# Patient Record
Sex: FEMALE | NOT HISPANIC OR LATINO | URBAN - METROPOLITAN AREA
[De-identification: names, ages, dates, MRNs, and addresses within clinical notes are randomized per-mention and may not be internally consistent; named-entity substitution may affect disease eponyms.]

---

## 2017-05-04 NOTE — PATIENT DISCUSSION
ELLIS OU:  PRESCRIBE ARTIFICIAL TEARS BID - QID. DECREASE OUTDOOR EXPOSURE AND USE UV PROTECTION/ SUNGLASSES WITH OUTDOOR ACTIVITIES. RETURN FOR FOLLOW UP AS SCHEDULED.

## 2017-05-04 NOTE — PATIENT DISCUSSION
Pinguecula Counseling:  I have explained to the patient at length the diagnosis of pinguecula and its pathophysiology. I recommended the patient adequately protect their eyes from excessive UV light and dry, leslie conditions. The use of artificial tears in dry conditions was encouraged. Return for follow-up as scheduled.

## 2017-10-12 ENCOUNTER — IMPORTED ENCOUNTER (OUTPATIENT)
Dept: URBAN - METROPOLITAN AREA CLINIC 1 | Facility: CLINIC | Age: 47
End: 2017-10-12

## 2017-10-12 PROBLEM — H52.13: Noted: 2017-10-12

## 2017-10-12 PROBLEM — H52.4: Noted: 2017-10-12

## 2017-10-12 PROCEDURE — S0621 ROUTINE OPHTHALMOLOGICAL EXA: HCPCS

## 2017-10-12 NOTE — PATIENT DISCUSSION
1. Myopia: Rx was given for correction if indicated and requested. 2. PresbyopiaReturn for an appointment in 1 year 36 with Dr. Annmarie Ivory.

## 2019-06-04 NOTE — PATIENT DISCUSSION
IF CONJUNCTIVAL CYST CONTINUES TO CAUSE FOREIGN BODY SENSATION, SUGGEST REFERRAL TO DR. Veronica Nowak TO HAVE IT DRAINED.

## 2019-06-04 NOTE — PATIENT DISCUSSION
New Prescription: prednisolone acetate (prednisolone acetate): drops,suspension: 1% 1 drop three times a day into left eye 06-

## 2019-06-04 NOTE — PATIENT DISCUSSION
CONJUNCTIVITIS (ALLERGIC), OU:  PRESCRIBE PRESCRIBED PAZEO QHS OU (OR OLOPATADINE BID OU), OR AS NEEDED. ALSO PRESCRIBED PRED ACETATE 1%TID OS FOR UP TO 1 WEEK, THEN STOP. RETURN FOR FOLLOW-UP AS SCHEDULED OR SOONER IF SYMPTOMS ARE WORSENING OR NOT IMPROVING.

## 2020-07-29 NOTE — PATIENT DISCUSSION
*Dry Eye Syndrome Counseling: I have discussed the diagnosis and the pathophysiology of this disease with the patient. Vision may be limited by dry eye, and symptoms exacerbated by environmental factors such as smoke, wind, or prolonged eye use. Treatment options include, but are not limited to, artificial tears, punctal plugs, topical cyclosporine, oral omega-3 supplements, Lipiflow, moisture goggles, and lubricating ointments. I stressed the importance of compliance with treatment. CALLED PATIENT AND RESCHEDULED TO [place] 10/02/2020 AT 1PM - ARRIVE 12PM.  WILL SEND NEW INSTRUCTIONS.

## 2020-10-29 NOTE — PATIENT DISCUSSION
SANTOSGUECULA, OU:  CONTINUE ARTIFICIAL TEARS BID - QID. DECREASE OUTDOOR EXPOSURE AND USE UV PROTECTION/ SUNGLASSES WITH OUTDOOR ACTIVITIES. RETURN FOR FOLLOW UP AS SCHEDULED.

## 2020-10-29 NOTE — PATIENT DISCUSSION
Continue: Systane Ultra (peg 400-propylene glycol): drops: 0.4-0.3% 1 drop three times a week into both eyes

## 2020-10-29 NOTE — PATIENT DISCUSSION
GLAUCOMA SUSPECT, OU : POSITIVE FAMILY HISTORY. OCT ONH STABLE WITHOUT THINNING AND NORMAL IOP. RETURN FOR FOLLOW UP AS SCHEDULED.

## 2020-10-29 NOTE — PATIENT DISCUSSION
MODERATE DRY EYE, OU: CONTINUE PRESERVATIVE FREE ARTIFICIAL TEARS BID - TID. RECOMMEND OMEGA-3 FISH OIL WITH PRIMARY CARE PHYSICIANS APPROVAL TO HELP RELIEVE SYMPTOMS. CONTINUE NIGHTLY LUBRICATING OINTMENT OR GEL. CONSIDER RESTASIS NEXT VISIT IF NOT RESPONSIVE OR IF SYMPTOMS PERSIST. RETURN FOR FOLLOW-UP AS SCHEDULED OR SOONER IF SYMPTOMS WORSEN.

## 2022-04-02 ASSESSMENT — VISUAL ACUITY
OD_SC: J1+
OS_SC: J1+
OS_SC: 20/20
OD_SC: 20/20

## 2022-04-02 ASSESSMENT — TONOMETRY
OD_IOP_MMHG: 14
OS_IOP_MMHG: 13